# Patient Record
Sex: FEMALE | Race: WHITE
[De-identification: names, ages, dates, MRNs, and addresses within clinical notes are randomized per-mention and may not be internally consistent; named-entity substitution may affect disease eponyms.]

---

## 2018-12-21 ENCOUNTER — HOSPITAL ENCOUNTER (OUTPATIENT)
Age: 53
End: 2018-12-21
Payer: COMMERCIAL

## 2018-12-21 DIAGNOSIS — R10.10: Primary | ICD-10-CM

## 2018-12-21 PROCEDURE — 87086 URINE CULTURE/COLONY COUNT: CPT

## 2018-12-21 PROCEDURE — 87088 URINE BACTERIA CULTURE: CPT

## 2019-09-10 ENCOUNTER — APPOINTMENT (RX ONLY)
Dept: URBAN - METROPOLITAN AREA CLINIC 96 | Facility: CLINIC | Age: 54
Setting detail: DERMATOLOGY
End: 2019-09-10

## 2019-09-10 DIAGNOSIS — L82.0 INFLAMED SEBORRHEIC KERATOSIS: ICD-10-CM

## 2019-09-10 DIAGNOSIS — L71.8 OTHER ROSACEA: ICD-10-CM

## 2019-09-10 PROCEDURE — ? PRESCRIPTION

## 2019-09-10 PROCEDURE — 99202 OFFICE O/P NEW SF 15 MIN: CPT

## 2019-09-10 PROCEDURE — ? COUNSELING

## 2019-09-10 RX ORDER — DOXYCYCLINE HYCLATE 100 MG/1
CAPSULE, GELATIN COATED ORAL
Qty: 60 | Refills: 3 | Status: ERX | COMMUNITY
Start: 2019-09-10

## 2019-09-10 RX ORDER — IVERMECTIN 10 MG/G
CREAM TOPICAL
Qty: 1 | Refills: 6 | Status: ERX | COMMUNITY
Start: 2019-09-10

## 2019-09-10 RX ADMIN — IVERMECTIN: 10 CREAM TOPICAL at 16:43

## 2019-09-10 RX ADMIN — DOXYCYCLINE HYCLATE: 100 CAPSULE, GELATIN COATED ORAL at 16:43

## 2019-09-10 ASSESSMENT — LOCATION DETAILED DESCRIPTION DERM
LOCATION DETAILED: RIGHT SUPERIOR LATERAL MALAR CHEEK
LOCATION DETAILED: RIGHT INFERIOR CENTRAL MALAR CHEEK
LOCATION DETAILED: LEFT INFERIOR CENTRAL MALAR CHEEK

## 2019-09-10 ASSESSMENT — LOCATION ZONE DERM: LOCATION ZONE: FACE

## 2019-09-10 ASSESSMENT — LOCATION SIMPLE DESCRIPTION DERM
LOCATION SIMPLE: LEFT CHEEK
LOCATION SIMPLE: RIGHT CHEEK

## 2019-12-04 ENCOUNTER — APPOINTMENT (RX ONLY)
Dept: URBAN - METROPOLITAN AREA CLINIC 96 | Facility: CLINIC | Age: 54
Setting detail: DERMATOLOGY
End: 2019-12-04

## 2019-12-04 DIAGNOSIS — L71.8 OTHER ROSACEA: ICD-10-CM

## 2019-12-04 PROBLEM — E03.9 HYPOTHYROIDISM, UNSPECIFIED: Status: ACTIVE | Noted: 2019-12-04

## 2019-12-04 PROCEDURE — 99213 OFFICE O/P EST LOW 20 MIN: CPT

## 2019-12-04 PROCEDURE — ? OTHER

## 2019-12-04 PROCEDURE — ? COUNSELING

## 2019-12-04 PROCEDURE — ? PRESCRIPTION

## 2019-12-04 ASSESSMENT — LOCATION SIMPLE DESCRIPTION DERM
LOCATION SIMPLE: LEFT CHEEK
LOCATION SIMPLE: RIGHT CHEEK

## 2019-12-04 ASSESSMENT — LOCATION DETAILED DESCRIPTION DERM
LOCATION DETAILED: RIGHT INFERIOR CENTRAL MALAR CHEEK
LOCATION DETAILED: LEFT INFERIOR CENTRAL MALAR CHEEK

## 2019-12-04 ASSESSMENT — LOCATION ZONE DERM: LOCATION ZONE: FACE

## 2019-12-04 NOTE — PROCEDURE: OTHER
Other (Free Text): Doing significantly better. Continue soolantra. Start targadox 50mg qd
Detail Level: Zone
Note Text (......Xxx Chief Complaint.): **on terbinafine for three months. Recomend Stayoff three months, then retreat two months

## 2023-09-01 NOTE — HP.PTEVAL
Patient's Visit Information
Visit Information
Visit Information: 
MAYNOR LIGHT is a 58 year old F referred to Physical Therapy by Dr. 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

Date of Evaluation: 09/01/23
Physical Therapist: YA Lloyd

Visit Plan
Frequency: 2x /Week
Duration: 2 Months
Plan: 2X/ week for 8 weeks for AT for neutral spine core stability, deep water hang, LE strength with HEP 
HEP:  seated piriformis stretch
Subjective
Subjective: Pt has back pain and when she goes to bed  It bothers her if she sits more than 10 min she has to shift because of the pain.  When she goes to sleep she is ok and then all of the sudden she gets a contraction in her back and it feels 
like she has a [place] horse in her back until she shifts and then she feels no pain and then it starts all over again.  This has been going on since April 2021.  She went to an ortho Dr cause she had hip pain.  She was told that that  had arthritis 
on her L hip but told her that it was her back.  They put her in a traction machine and then she started with back pain but the hip pain left for a couple of weeks.  She had more control over her bladder as well for a couple of weeks.  She is having 
issues with urgency and leaking with coughing.  She is seeing pain management with some med a little over Advil and that is helping a little bit.  Dr did an x-ray.  The Dr wants an MRI but not sure when that will be. 
Pain
Back pain: 
      Pain Intensity (Out of 10): 2
L hip pain: 
      Pain Intensity (Out of 10): 0
Objective
Objective: Gait:  walks with decrease stride length 
Trunk AROM:  flex 100%, ext 75%, SB 75%B, Rot B 75% B 
LE MMT: 
R hip flex 19.2 and L 19.5 
R knee ext 22.9 and L 25.1 
R knee flex  15.5 and L 15.9 
R hip abd 13.1 and 16.5 
bridge:  pt is able to do 3/4 normal ROM bridge 
piriformis length: pt is tighter on the L and pain with end range stretching 
Negative SLR
Balance/Special Test Scores
Oswestry Low Back Score: 13
Goals
Goal 1:: I HEP
Goal Time Frame: 6-8 Weeks
Goal 2:: Decrease overall back pain and be able to sit greater than 10 min without having to weight shift.
Goal Time Frame: 6-8 Weeks
Goal 3:: Increase LE strength (at time of the eval:  LE MMT: 
R hip flex 19.2 and L 19.5 
R knee ext 22.9 and L 25.1 
R knee flex  15.5 and L 15.9 
R hip abd 13.1 and 16.5). 

Goal Time Frame: 6-8 Weeks
Goal 4:: Pt to be able to stretch L piriformis with good muscle length and no pain
Goal Time Frame: 6-8 Weeks
Rehabilitation Potential
Rehabilitation Potential: Good
Anticipated Interventions
Patient/Client Instruction:  Educate patient on: Condition and Plan of Care
For the Purpose of:: To decrease pain, To increase ROM, To improve nutrient delivery to tissue, To improve muscle performance and motor function, To improve ability to perform ADL's, To increase tolerance to activity/condition/position, To improve 
performance and independence with ADL's, To decrease level of supervision to perform tasks, To decrease soft tissue restriction and To increase flexibility/ROM
Therapeutic Exercise to Include: Strength training, Postural training, Flexibilty training, Neuromotor development,  In an aquatic setting , Active ROM and Dynamic Lumbar Stabilization
For the Purpose of:: To decrease pain, To increase ROM, To improve nutrient delivery to tissue, To improve muscle performance and motor function, To improve ability to perform ADL's, To increase tolerance to activity/condition/position, To improve 
performance and independence with ADL's, To improve ability of physical actions for home/community/work/leisure, To improve gait and locomotor functions, To improve health of tissue, To increase flexibility/ROM and To improve endurance
Text: 




Thank you for the opportunity to evaluate your patient.

For Medicare and Medicare HMO plans, please review the plan of care and approve it.
It will need to be FAXED BACK to us at [phone] for Medicare purposes.
For Medicare only, by signing this I certify the plan of care.

Please let me know if there are questions or concerns regarding this plan of care.


Physician Signature:_________________________________________________Date:____________

## 2023-09-01 NOTE — HP.PTEVAL_ITS
Patient's Visit Information    
Visit Information    
Visit Information:     
MAYNOR LIGHT is a 58 year old F referred to Physical Therapy by Dr. Alejandro Brooke DO with a diagnosis of spinal stenosis and spondylosis with radic  
Lumbar.    
    
Date of Evaluation: 09/01/23    
Physical Therapist: YA Lloyd    
    
Visit Plan    
Frequency: 2x /Week    
Duration: 2 Months    
Plan: 2X/ week for 8 weeks for AT for neutral spine core stability, deep water   
hang, LE strength with HEP     
HEP:  seated piriformis stretch    
Subjective    
Subjective: Pt has back pain and when she goes to bed  It bothers her if she   
sits more than 10 min she has to shift because of the pain.  When she goes to   
sleep she is ok and then all of the sudden she gets a contraction in her back   
and it feels like she has a [place] horse in her back until she shifts and then   
she feels no pain and then it starts all over again.  This has been going on   
since April 2021.  She went to an ortho Dr cause she had hip pain.  She was told  
that that  had arthritis on her L hip but told her that it was her back.  They   
put her in a traction machine and then she started with back pain but the hip   
pain left for a couple of weeks.  She had more control over her bladder as well   
for a couple of weeks.  She is having issues with urgency and leaking with   
coughing.  She is seeing pain management with some med a little over Advil and   
that is helping a little bit.  Dr did an x-ray.  The Dr wants an MRI but not   
sure when that will be.     
Pain    
Back pain:     
      Pain Intensity (Out of 10): 2    
L hip pain:     
      Pain Intensity (Out of 10): 0    
Objective    
Objective: Gait:  walks with decrease stride length     
Trunk AROM:  flex 100%, ext 75%, SB 75%B, Rot B 75% B     
LE MMT:     
R hip flex 19.2 and L 19.5     
R knee ext 22.9 and L 25.1     
R knee flex  15.5 and L 15.9     
R hip abd 13.1 and 16.5     
bridge:  pt is able to do 3/4 normal ROM bridge     
piriformis length: pt is tighter on the L and pain with end range stretching     
Negative SLR    
Balance/Special Test Scores    
Oswestry Low Back Score: 13    
Goals    
Goal 1:: I HEP    
Goal Time Frame: 6-8 Weeks    
Goal 2:: Decrease overall back pain and be able to sit greater than 10 min   
without having to weight shift.    
Goal Time Frame: 6-8 Weeks    
Goal 3:: Increase LE strength (at time of the eval:  LE MMT:     
R hip flex 19.2 and L 19.5     
R knee ext 22.9 and L 25.1     
R knee flex  15.5 and L 15.9     
R hip abd 13.1 and 16.5).     
    
Goal Time Frame: 6-8 Weeks    
Goal 4:: Pt to be able to stretch L piriformis with good muscle length and no   
pain    
Goal Time Frame: 6-8 Weeks    
Rehabilitation Potential    
Rehabilitation Potential: Good    
Anticipated Interventions    
Patient/Client Instruction:  Educate patient on: Condition and Plan of Care    
For the Purpose of:: To decrease pain, To increase ROM, To improve nutrient   
delivery to tissue, To improve muscle performance and motor function, To improve  
ability to perform ADL's, To increase tolerance to activity/condition/position,   
To improve performance and independence with ADL's, To decrease level of   
supervision to perform tasks, To decrease soft tissue restriction and To   
increase flexibility/ROM    
Therapeutic Exercise to Include: Strength training, Postural training,   
Flexibilty training, Neuromotor development,  In an aquatic setting , Active ROM  
and Dynamic Lumbar Stabilization    
For the Purpose of:: To decrease pain, To increase ROM, To improve nutrient   
delivery to tissue, To improve muscle performance and motor function, To improve  
ability to perform ADL's, To increase tolerance to activity/condition/position,   
To improve performance and independence with ADL's, To improve ability of   
physical actions for home/community/work/leisure, To improve gait and locomotor   
functions, To improve health of tissue, To increase flexibility/ROM and To   
improve endurance    
Text:     
    
    
    
    
Thank you for the opportunity to evaluate your patient.    
    
For Medicare and Medicare HMO plans, please review the plan of care and approve   
it.    
It will need to be FAXED BACK to us at [phone] for Medicare purposes.    
For Medicare only, by signing this I certify the plan of care.    
    
Please let me know if there are questions or concerns regarding this plan of   
care.    
    
    
Physician   
Signature:_________________________________________________Date:____________

## 2023-10-04 NOTE — HP.PTREVAL
Re-Evaluation
Intro: 
Dr. Alejandro Brooke, DO,


It has been my pleasure to treat MAYNOR LIGHT over the last 9 visits for spinal stenosis and spondylosis with radic Lumbar.
Please see the progress note below for an update on the physical therapy plan of care!

Subjective
Subjective: She felt good in the pool.  The pain is shifting.  She has no pain currently.  Last night she did feels some tingling which was the first time she had it.  She had a hard time sleeping on the R side so she slept on the L. She still feels 
the overall heaviness and feels she needs strength.

Objective
Objective/Function: MMT: 
R hip flex 19.2 and L 19.5 
R knee ext 22.9 and L 25.1 
R knee flex  15.5 and L 15.9 
R hip abd 13.1 and 16.5). 
L piriformis has more flexibility and no pain with stretching

Plan
Plan
Plan: 2 additional visits for HEP for neutral spine core stability and LE strength

Balance/Gait/Functional tests
Balance/Special Test Scores
Oswestry Low Back Score: 10

Goals
Goals
Goal 1:: I HEP
Goal Time Frame: 6-8 Weeks
Goal 2:: Decrease overall back pain and be able to sit greater than 10 min without having to weight shift.
Goal Time Frame: 6-8 Weeks
Goal Progress: Progressing
Goal 3:: Increase LE strength (at time of the eval:  LE MMT: 
R hip flex 19.2 and L 19.5 ( no improvement) 
R knee ext 26.5 and L 28.8 
R knee flex  17.6 and L 18.2 
R hip abd 16.3 and 17.7 

Goal Time Frame: 6-8 Weeks
Goal Progress: Progressing
Goal 4:: Pt to be able to stretch L piriformis with good muscle length and no pain
Goal Time Frame: 6-8 Weeks

Anticipated Interventions
Anticipated Interventions
Patient/Client Instruction:  Educate patient on: Condition and Plan of Care
For the Purpose of:: To decrease pain, To increase ROM, To improve nutrient delivery to tissue, To improve muscle performance and motor function, To improve ability to perform ADL's, To increase tolerance to activity/condition/position, To improve 
performance and independence with ADL's, To decrease level of supervision to perform tasks, To decrease soft tissue restriction and To increase flexibility/ROM
Therapeutic Exercise to Include: Strength training, Postural training, Flexibilty training, Neuromotor development,  In an aquatic setting , Active ROM and Dynamic Lumbar Stabilization
For the Purpose of:: To decrease pain, To increase ROM, To improve nutrient delivery to tissue, To improve muscle performance and motor function, To improve ability to perform ADL's, To increase tolerance to activity/condition/position, To improve 
performance and independence with ADL's, To improve ability of physical actions for home/community/work/leisure, To improve gait and locomotor functions, To improve health of tissue, To increase flexibility/ROM and To improve endurance
Re-Evaluation Ending
Re-evaluation ending: 
Please do not hesitate to contact me at [phone] by phone or Fax: [phone] if you have questions or concerns regarding this new plan of care!

Sincerely, 


Thea Meier MPT

## 2023-10-04 NOTE — HP.PTREVAL_ITS
Re-Evaluation    
Intro:     
Dr. Alejandro Brooke, DO,    
    
    
It has been my pleasure to treat MAYNOR LIGHT over the last 9 visits for   
spinal stenosis and spondylosis with radic Lumbar.    
Please see the progress note below for an update on the physical therapy plan of  
care!    
    
Subjective    
Subjective: She felt good in the pool.  The pain is shifting.  She has no pain   
currently.  Last night she did feels some tingling which was the first time she   
had it.  She had a hard time sleeping on the R side so she slept on the L. She   
still feels the overall heaviness and feels she needs strength.    
    
Objective    
Objective/Function: MMT:     
R hip flex 19.2 and L 19.5     
R knee ext 22.9 and L 25.1     
R knee flex  15.5 and L 15.9     
R hip abd 13.1 and 16.5).     
L piriformis has more flexibility and no pain with stretching    
    
Plan    
Plan    
Plan: 2 additional visits for HEP for neutral spine core stability and LE   
strength    
    
Balance/Gait/Functional tests    
Balance/Special Test Scores    
Oswestry Low Back Score: 10    
    
Goals    
Goals    
Goal 1:: I HEP    
Goal Time Frame: 6-8 Weeks    
Goal 2:: Decrease overall back pain and be able to sit greater than 10 min   
without having to weight shift.    
Goal Time Frame: 6-8 Weeks    
Goal Progress: Progressing    
Goal 3:: Increase LE strength (at time of the eval:  LE MMT:     
R hip flex 19.2 and L 19.5 ( no improvement)     
R knee ext 26.5 and L 28.8     
R knee flex  17.6 and L 18.2     
R hip abd 16.3 and 17.7     
    
Goal Time Frame: 6-8 Weeks    
Goal Progress: Progressing    
Goal 4:: Pt to be able to stretch L piriformis with good muscle length and no   
pain    
Goal Time Frame: 6-8 Weeks    
    
Anticipated Interventions    
Anticipated Interventions    
Patient/Client Instruction:  Educate patient on: Condition and Plan of Care    
For the Purpose of:: To decrease pain, To increase ROM, To improve nutrient   
delivery to tissue, To improve muscle performance and motor function, To improve  
ability to perform ADL's, To increase tolerance to activity/condition/position,   
To improve performance and independence with ADL's, To decrease level of   
supervision to perform tasks, To decrease soft tissue restriction and To   
increase flexibility/ROM    
Therapeutic Exercise to Include: Strength training, Postural training,   
Flexibilty training, Neuromotor development,  In an aquatic setting , Active ROM  
and Dynamic Lumbar Stabilization    
For the Purpose of:: To decrease pain, To increase ROM, To improve nutrient   
delivery to tissue, To improve muscle performance and motor function, To improve  
ability to perform ADL's, To increase tolerance to activity/condition/position,   
To improve performance and independence with ADL's, To improve ability of   
physical actions for home/community/work/leisure, To improve gait and locomotor   
functions, To improve health of tissue, To increase flexibility/ROM and To   
improve endurance    
Re-Evaluation Ending    
Re-evaluation ending:     
Please do not hesitate to contact me at [phone] by phone or Fax:   
[phone] if you have questions or concerns regarding this new plan of care!    
    
Sincerely,     
    
    
Thea Meier MPT

## 2023-10-11 ENCOUNTER — HOSPITAL ENCOUNTER (OUTPATIENT)
Dept: HOSPITAL 100 - PT | Age: 58
Discharge: HOME | End: 2023-10-11
Payer: COMMERCIAL

## 2023-10-11 DIAGNOSIS — M48.061: Primary | ICD-10-CM

## 2023-10-11 DIAGNOSIS — M47.26: ICD-10-CM

## 2023-10-11 PROCEDURE — 97530 THERAPEUTIC ACTIVITIES: CPT

## 2023-10-11 PROCEDURE — 97113 AQUATIC THERAPY/EXERCISES: CPT

## 2023-10-11 PROCEDURE — 97161 PT EVAL LOW COMPLEX 20 MIN: CPT

## 2023-10-11 PROCEDURE — 97110 THERAPEUTIC EXERCISES: CPT

## 2024-01-02 NOTE — HP.PT.NRP(2)
Patient Information
Patient Information: 
MAYNOR LIGHT was seen in my office for initial evaluation on . 

The following Plan of Care was established for this patient:

Last Seen
Last Seen: 
This patient was last seen in our office .  Pertinent comments regarding their Physical therapy will appear below:





At this point I will be discontinuing this patient from physical therapy.  I would be happy to see this patient again in the future if found appropriate by the physician.

Thank you!

Thea Meier, MPT
